# Patient Record
Sex: FEMALE | Race: OTHER | Employment: FULL TIME | URBAN - METROPOLITAN AREA
[De-identification: names, ages, dates, MRNs, and addresses within clinical notes are randomized per-mention and may not be internally consistent; named-entity substitution may affect disease eponyms.]

---

## 2024-03-05 ENCOUNTER — TELEMEDICINE (OUTPATIENT)
Age: 39
End: 2024-03-05
Payer: COMMERCIAL

## 2024-03-05 DIAGNOSIS — R55 SYNCOPE AND COLLAPSE: Primary | ICD-10-CM

## 2024-03-05 DIAGNOSIS — Z82.49 FAMILY HISTORY OF EARLY CAD: ICD-10-CM

## 2024-03-05 DIAGNOSIS — E78.00 HYPERCHOLESTEREMIA: ICD-10-CM

## 2024-03-05 DIAGNOSIS — Z82.79 FAMILY HISTORY OF VSD (VENTRICULAR SEPTAL DEFECT): ICD-10-CM

## 2024-03-05 PROCEDURE — G2211 COMPLEX E/M VISIT ADD ON: HCPCS | Performed by: SPECIALIST

## 2024-03-05 PROCEDURE — 99213 OFFICE O/P EST LOW 20 MIN: CPT | Performed by: SPECIALIST

## 2024-03-05 NOTE — PROGRESS NOTES
Jose Guadalupe Stanford     1985       Allen Ceballos MD, [facility]  video visit by Elda video visit 3/5/2024  PCP is [facility] P[facility]   [facility] [place]  [facility]  HPI:  Jose Guadalupe Stanford is a 38 y.o. female   Patient seen by video visit with Elda from my [facility] echo lab location to her home with  present.  This to follow-up on an echocardiogram.  She is a 38-year-old female with elevated lipids family history of stroke in father and prior DVT.  She was a [facility] for fasting labs and was started on Crestor.  She had a near syncopal episode at a recent holiday and when younger had near syncope episodes.  Her first child had a VSD.  EKG suggested left atrial enlargement.  Today she is seen by video visit to discuss her echo.    Symptom wise during menstral cycle feesl lightheaded  Not syncope l  To see Heme later in year   Overall well  Denies chest pain, edema, syncope or shortness of breath at rest   Has no tachycardia , palpitations or sense of arrythmia      03/01/24    ECHO (TTE) COMPLETE (PRN CONTRAST/BUBBLE/STRAIN/3D) 03/03/2024  6:57 PM (Final)    Interpretation Summary    Left Ventricle: Normal left ventricular systolic function with a visually estimated EF of 55 - 60%. Left ventricle size is normal. Normal wall thickness. Normal wall motion. Normal diastolic function.    Image quality is good. Normal study.    Assessment/Plan:     1.  Prior syncope.  Echocardiogram shows normal LV function.  There is no impairment in flow.  She does not have left atrial enlargement.  Echo is more accurate than EKG for this.  She is reassured  2.  Family history of VSD there is no evidence of shunting by echo study.  3.  Mild dizziness on occasion mainly related to menstrual cycle.  No syncope no drops of blood pressure.  4.  Hypercholesterolemia on statin  Follow-up as needed  The primary encounter diagnosis was Syncope and collapse.

## 2024-03-06 PROBLEM — Z82.79 FAMILY HISTORY OF VSD (VENTRICULAR SEPTAL DEFECT): Status: ACTIVE | Noted: 2024-03-06

## 2024-03-06 PROBLEM — E78.00 HYPERCHOLESTEREMIA: Status: ACTIVE | Noted: 2024-03-06

## 2024-03-06 PROBLEM — Z82.49 FAMILY HISTORY OF EARLY CAD: Status: ACTIVE | Noted: 2024-03-06

## 2024-03-06 PROBLEM — R55 SYNCOPE AND COLLAPSE: Status: ACTIVE | Noted: 2024-03-06
